# Patient Record
Sex: MALE | Race: WHITE | NOT HISPANIC OR LATINO | URBAN - METROPOLITAN AREA
[De-identification: names, ages, dates, MRNs, and addresses within clinical notes are randomized per-mention and may not be internally consistent; named-entity substitution may affect disease eponyms.]

---

## 2022-08-25 ENCOUNTER — APPOINTMENT (RX ONLY)
Dept: URBAN - METROPOLITAN AREA CLINIC 187 | Facility: CLINIC | Age: 72
Setting detail: DERMATOLOGY
End: 2022-08-25

## 2022-08-25 DIAGNOSIS — D22 MELANOCYTIC NEVI: ICD-10-CM | Status: INADEQUATELY CONTROLLED

## 2022-08-25 DIAGNOSIS — D485 NEOPLASM OF UNCERTAIN BEHAVIOR OF SKIN: ICD-10-CM

## 2022-08-25 PROBLEM — D22.5 MELANOCYTIC NEVI OF TRUNK: Status: ACTIVE | Noted: 2022-08-25

## 2022-08-25 PROBLEM — D48.5 NEOPLASM OF UNCERTAIN BEHAVIOR OF SKIN: Status: ACTIVE | Noted: 2022-08-25

## 2022-08-25 PROCEDURE — 11102 TANGNTL BX SKIN SINGLE LES: CPT | Mod: 59

## 2022-08-25 PROCEDURE — 11302 SHAVE SKIN LESION 1.1-2.0 CM: CPT

## 2022-08-25 PROCEDURE — ? COUNSELING

## 2022-08-25 PROCEDURE — ? SHAVE REMOVAL

## 2022-08-25 PROCEDURE — ? BIOPSY BY SHAVE METHOD

## 2022-08-25 ASSESSMENT — LOCATION DETAILED DESCRIPTION DERM
LOCATION DETAILED: RIGHT MEDIAL INFERIOR CHEST
LOCATION DETAILED: LEFT MEDIAL INFERIOR CHEST

## 2022-08-25 ASSESSMENT — LOCATION SIMPLE DESCRIPTION DERM: LOCATION SIMPLE: CHEST

## 2022-08-25 ASSESSMENT — LOCATION ZONE DERM: LOCATION ZONE: TRUNK

## 2022-08-25 NOTE — PROCEDURE: BIOPSY BY SHAVE METHOD
Body Location Override (Optional - Billing Will Still Be Based On Selected Body Map Location If Applicable): left chest
Detail Level: Detailed
Depth Of Biopsy: dermis
Was A Bandage Applied: Yes
Size Of Lesion In Cm: 0.3
X Size Of Lesion In Cm: 0
Biopsy Type: H and E
Biopsy Method: Dermablade
Anesthesia Type: 1% lidocaine with epinephrine
Anesthesia Volume In Cc (Will Not Render If 0): 0.5
Hemostasis: Electrocautery
Wound Care: Vaseline
Dressing: bandage
Destruction After The Procedure: No
Type Of Destruction Used: Curettage
Curettage Text: The wound bed was treated with curettage after the biopsy was performed.
Cryotherapy Text: The wound bed was treated with cryotherapy after the biopsy was performed.
Electrodesiccation Text: The wound bed was treated with electrodesiccation after the biopsy was performed.
Electrodesiccation And Curettage Text: The wound bed was treated with electrodesiccation and curettage after the biopsy was performed.
Silver Nitrate Text: The wound bed was treated with silver nitrate after the biopsy was performed.
Lab: -[id number]
Consent: Written consent was obtained and risks were reviewed including but not limited to scarring, infection, bleeding, scabbing, incomplete removal, nerve damage and allergy to anesthesia.
Post-Care Instructions: I reviewed with the patient in detail post-care instructions. Patient is to keep the biopsy site dry overnight, and then apply bacitracin twice daily until healed. Patient may apply hydrogen peroxide soaks to remove any crusting.
Notification Instructions: Patient will be notified of biopsy results. However, patient instructed to call the office if not contacted within 2 weeks.
Billing Type: [employer]
Information: Selecting Yes will display possible errors in your note based on the variables you have selected. This validation is only offered as a suggestion for you. PLEASE NOTE THAT THE VALIDATION TEXT WILL BE REMOVED WHEN YOU FINALIZE YOUR NOTE. IF YOU WANT TO FAX A PRELIMINARY NOTE YOU WILL NEED TO TOGGLE THIS TO 'NO' IF YOU DO NOT WANT IT IN YOUR FAXED NOTE.

## 2022-08-25 NOTE — HPI: SKIN LESION (DYSPLASTIC NEVUS)
Is This A New Presentation, Or A Follow-Up?: Dysplastic Nevus
When Was The Dysplastic Nevus Biopsied? (Optional): 7/19/22
Accession # (Optional): [id number]
Location From Outside Provider (Will Override Previously Chosen Location): Right chest

## 2022-08-25 NOTE — PROCEDURE: SHAVE REMOVAL
Medical Necessity Information: It is in your best interest to select a reason for this procedure from the list below. All of these items fulfill various CMS LCD requirements except the new and changing color options.
Medical Necessity Clause: This procedure was medically necessary because the lesion that was treated was:
Lab: -[id number]
Lab Facility: [id number]
Body Location Override (Optional - Billing Will Still Be Based On Selected Body Map Location If Applicable): right chest
Detail Level: Detailed
Was A Bandage Applied: Yes
Size Of Lesion In Cm (Required): 1.5
Biopsy Method: Dermablade
Anesthesia Type: 1% lidocaine with epinephrine
Anesthesia Volume In Cc: 4
Hemostasis: Electrocautery
Wound Care: Vaseline
Accession #: [id number]
Render Path Notes In Note?: No
Consent was obtained from the patient. The risks and benefits to therapy were discussed in detail. Specifically, the risks of infection, scarring, bleeding, prolonged wound healing, incomplete removal, allergy to anesthesia, nerve injury and recurrence were addressed. Prior to the procedure, the treatment site was clearly identified and confirmed by the patient. All components of Universal Protocol/PAUSE Rule completed.
Post-Care Instructions: I reviewed with the patient in detail post-care instructions. Patient is to keep the biopsy site dry overnight, and then apply bacitracin twice daily until healed. Patient may apply hydrogen peroxide soaks to remove any crusting.
Notification Instructions: Patient will be notified of pathology results. However, patient instructed to call the office if not contacted within 2 weeks.
Billing Type: [employer]

## 2022-08-25 NOTE — PROCEDURE: MIPS QUALITY
Quality 111:Pneumonia Vaccination Status For Older Adults: Pneumococcal vaccine administered on or after patientâs 60th birthday and before the end of the measurement period
Quality 226: Preventive Care And Screening: Tobacco Use: Screening And Cessation Intervention: Patient screened for tobacco use and is an ex/non-smoker
Detail Level: Detailed
Quality 130: Documentation Of Current Medications In The Medical Record: Current Medications with Name, Dosage, Frequency, or Route not Documented, Reason not Given
Quality 265: Biopsy Follow-Up: Biopsy results reviewed, communicated, tracked, and documented
Quality 431: Preventive Care And Screening: Unhealthy Alcohol Use - Screening: Patient not identified as an unhealthy alcohol user when screened for unhealthy alcohol use using a systematic screening method

## 2022-09-02 ENCOUNTER — APPOINTMENT (RX ONLY)
Dept: URBAN - METROPOLITAN AREA CLINIC 111 | Facility: CLINIC | Age: 72
Setting detail: DERMATOLOGY
End: 2022-09-02

## 2022-09-02 DIAGNOSIS — I87.2 VENOUS INSUFFICIENCY (CHRONIC) (PERIPHERAL): ICD-10-CM

## 2022-09-02 PROCEDURE — 36465 NJX NONCMPND SCLRSNT 1 VEIN: CPT | Mod: LT

## 2022-09-02 PROCEDURE — ? MEDICAL CONSULTATION: VENOUS DISEASE

## 2022-09-02 PROCEDURE — ? DOPPLER US

## 2022-09-02 PROCEDURE — ? VEIN TREATMENT PLAN

## 2022-09-02 PROCEDURE — 93970 EXTREMITY STUDY: CPT

## 2022-09-02 PROCEDURE — ? VARITHENA SCLEROTHERAPY

## 2022-09-02 ASSESSMENT — LOCATION ZONE DERM: LOCATION ZONE: LEG

## 2022-09-02 ASSESSMENT — LOCATION SIMPLE DESCRIPTION DERM: LOCATION SIMPLE: LEFT PRETIBIAL REGION

## 2022-09-02 ASSESSMENT — LOCATION DETAILED DESCRIPTION DERM: LOCATION DETAILED: LEFT DISTAL PRETIBIAL REGION

## 2022-09-02 NOTE — PROCEDURE: VEIN TREATMENT PLAN
Intro Statement (Will Not Render If Left Blank): Patient has failed 3 months of conservative therapy (compression, elevation).
Detail Level: Simple
Symptom Statement (Will Not Render If Left Blank): US demonstrates severe reflux in the right GSV, as well as the distal left GSV. \\n\\nThe plan is to proceed with Varithena of the left distal GSV. \\n\\Marii though he has reflux in the right leg, he has no symptoms. This leg will not be treated. \\n\\nRisks, benefits, and alternatives were discussed.
[place] Sessions - Left: 1

## 2022-09-02 NOTE — PROCEDURE: DOPPLER US
Right Ssv/Lsv Reflux (Sec): no reflux
Right Distal Gsv Size: 6 mm
Detail Level: Simple
Right Distal Ssv/Lsv Size: 3 mm
Right Sfj Reflux (Sec): 2 seconds reflux
Right Mid Gsv Size: 9 mm
Comments: Right GSV becomes epifacial in proximal thgih.
Left Distal Gsv Size: 4 mm
Treatment Number (Optional): Venous: R GSV, L D GSV
Left Mid Gsv Size: 5 mm
Right Sfj Size: 8 mm
Use Ssv Or Lsv: SSV
Right Distal Gsv Reflux (Sec): 3 seconds reflux
Other Right Leg Doppler Findings: Tribs 4.8mm [id number], medial/posterior calf.
Other Left Leg Doppler Findings: Tribs 3.4mm reflux1.4mm. medial calf

## 2022-09-02 NOTE — PROCEDURE: VARITHENA SCLEROTHERAPY
Render Post Care In Note: No
Volume Of Varithena Foam Removed From Canister (Cc): 0
Disposition: Compression stockings and short stretch elastic bandages were placed postoperatively.
Detail Level: Detailed
Consent was obtained with risks, benefits, and alternatives discussed for the above procedures.
Number Of Injections (Will Not Render If 0): 1
Sclerosant (A): Varithena Foam
Sclerosant Volume (Cc): 3
Lot # A: [id number]
Show Inventory Tab: Hide
Expiration Date A (Month Year): NAOP6997

## 2022-09-02 NOTE — PROCEDURE: MEDICAL CONSULTATION: VENOUS DISEASE
Left Leg Induration: 0- None
Include Ceap In The Note?: Yes
Left Leg: Peripheral Vascular Disease?: No
Left Dorsalis Pedis Pulse: 2 (Easily palpable)
Right Leg Circumference: not performed
Detail Level: Detailed
Left Leg Pain: 2- Daily, moderate activity limitation, occasional analgesics
Right Leg Venous Hyperpigmentation: 0- None or focal low intensity (tan)
Left Leg Varicose Veins: 1- Few, scattered: branch varicose veins
Left Leg Compression Therapy: 2- Wears elastic stocking most days
Left Leg Circumference: medium
Right Leg Compression Therapy: 0- None or noncompliant

## 2022-09-02 NOTE — HPI: VARICOSE VEINS (VARICOSITIES)
How Severe Are They?: mild
Is This A New Presentation, Or A Follow-Up?: Varicose Veins
Additional History: Patient is having aches in his left leg for about two months. He is very active and no kind of medical issues.

## 2022-10-14 ENCOUNTER — APPOINTMENT (RX ONLY)
Dept: URBAN - METROPOLITAN AREA CLINIC 111 | Facility: CLINIC | Age: 72
Setting detail: DERMATOLOGY
End: 2022-10-14

## 2022-10-14 DIAGNOSIS — I87.2 VENOUS INSUFFICIENCY (CHRONIC) (PERIPHERAL): ICD-10-CM

## 2022-10-14 PROCEDURE — ? LOWER EXTREMITY DOPPLER US

## 2022-10-14 PROCEDURE — 93971 EXTREMITY STUDY: CPT

## 2022-10-14 NOTE — PROCEDURE: LOWER EXTREMITY DOPPLER US
Size Options: Use Free Text
Add Milliseconds Of Reflux To Note?: Yes
Size In Mm: 3.4
Detail Level: Simple
Intraluminal Thrombus: No
Treatment Number (Optional): Venous: L Varithena F/U: D GSV Closed
Size In Mm: 5.3
Left Intraluminal Thrombus- No: The left deep veins were imaged from the level of the common femoral vein to the posterior tibial veins. All deep veins demonstrated compressibility without evidence of intraluminal thrombus.
Right Intraluminal Thrombus- No: The right deep veins were imaged from the level of the common femoral vein to the posterior tibial veins. All deep veins demonstrated compressibility without evidence of intraluminal thrombus.
Continue Conservative Therapy Text: Continue conservative treatment (such as compression stockings, OTC analgesics, and exercise) and consider intervention if no change or worsening symptoms to varicosities.
Size In Mm: 5.5
Left Tributary Reflux (In Seconds - Leave Blank If Not Applicable): 0.8
Size In Mm: 3.8
Size In Mm: 0.0
Size In Mm: 5.2
Left Intraluminal Thrombus- Yes: The left deep veins were imaged from the level of the common femoral vein to the posterior tibial veins. There was evidence of intraluminal thrombus as noted above.
Left Tributary Size In Mm: 3.6
Right Intraluminal Thrombus- Yes: The right deep veins were imaged from the level of the common femoral vein to the posterior tibial veins. There was evidence of intraluminal thrombus as noted above.

## 2022-10-28 ENCOUNTER — APPOINTMENT (RX ONLY)
Dept: URBAN - METROPOLITAN AREA CLINIC 111 | Facility: CLINIC | Age: 72
Setting detail: DERMATOLOGY
End: 2022-10-28

## 2022-10-28 DIAGNOSIS — I87.2 VENOUS INSUFFICIENCY (CHRONIC) (PERIPHERAL): ICD-10-CM

## 2022-10-28 PROCEDURE — 99213 OFFICE O/P EST LOW 20 MIN: CPT

## 2022-10-28 PROCEDURE — ? FOLLOW UP ORDERS

## 2022-10-28 NOTE — PROCEDURE: FOLLOW UP ORDERS
Detail Level: Zone
Follow-Up Preamble: Patient is s/p Varithena left distal GSV. Current US shows left distal GSV closed. He has no new symptoms. His cramping and heaviness have resolved. \\n\\nHis prior US showed reflux in the right GSV, but he has no symptoms in the right leg. \\n\\nWe will continue to observe.
Follow-Up (Free Text): One year with US, or sooner if he develops any symptoms.

## 2024-04-05 ENCOUNTER — APPOINTMENT (RX ONLY)
Dept: URBAN - METROPOLITAN AREA CLINIC 187 | Facility: CLINIC | Age: 74
Setting detail: DERMATOLOGY
End: 2024-04-05

## 2024-04-05 DIAGNOSIS — L57.8 OTHER SKIN CHANGES DUE TO CHRONIC EXPOSURE TO NONIONIZING RADIATION: ICD-10-CM | Status: INADEQUATELY CONTROLLED

## 2024-04-05 DIAGNOSIS — L85.3 XEROSIS CUTIS: ICD-10-CM

## 2024-04-05 DIAGNOSIS — L81.4 OTHER MELANIN HYPERPIGMENTATION: ICD-10-CM | Status: UNCHANGED

## 2024-04-05 DIAGNOSIS — L57.0 ACTINIC KERATOSIS: ICD-10-CM | Status: WORSENING

## 2024-04-05 DIAGNOSIS — D18.0 HEMANGIOMA: ICD-10-CM | Status: UNCHANGED

## 2024-04-05 DIAGNOSIS — L82.1 OTHER SEBORRHEIC KERATOSIS: ICD-10-CM | Status: UNCHANGED

## 2024-04-05 PROBLEM — D18.01 HEMANGIOMA OF SKIN AND SUBCUTANEOUS TISSUE: Status: ACTIVE | Noted: 2024-04-05

## 2024-04-05 PROCEDURE — ? SUNSCREEN RECOMMENDATIONS

## 2024-04-05 PROCEDURE — ? COUNSELING

## 2024-04-05 PROCEDURE — ? LIQUID NITROGEN

## 2024-04-05 PROCEDURE — 17000 DESTRUCT PREMALG LESION: CPT

## 2024-04-05 PROCEDURE — 17003 DESTRUCT PREMALG LES 2-14: CPT

## 2024-04-05 PROCEDURE — 99213 OFFICE O/P EST LOW 20 MIN: CPT | Mod: 25

## 2024-04-05 PROCEDURE — ? EDUCATIONAL RESOURCES PROVIDED

## 2024-04-05 PROCEDURE — ? OTC TREATMENT REGIMEN

## 2024-04-05 ASSESSMENT — LOCATION SIMPLE DESCRIPTION DERM
LOCATION SIMPLE: RIGHT FOREARM
LOCATION SIMPLE: LEFT THIGH
LOCATION SIMPLE: RIGHT PRETIBIAL REGION
LOCATION SIMPLE: LEFT SHOULDER
LOCATION SIMPLE: RIGHT SHOULDER
LOCATION SIMPLE: RIGHT WRIST
LOCATION SIMPLE: RIGHT KNEE
LOCATION SIMPLE: POSTERIOR NECK
LOCATION SIMPLE: LEFT UPPER BACK
LOCATION SIMPLE: RIGHT THIGH
LOCATION SIMPLE: RIGHT UPPER BACK
LOCATION SIMPLE: LEFT LOWER BACK
LOCATION SIMPLE: LEFT FOREARM
LOCATION SIMPLE: LEFT PRETIBIAL REGION
LOCATION SIMPLE: LEFT FOREHEAD
LOCATION SIMPLE: RIGHT ELBOW
LOCATION SIMPLE: CHEST
LOCATION SIMPLE: ABDOMEN
LOCATION SIMPLE: LEFT EAR
LOCATION SIMPLE: LEFT TEMPLE
LOCATION SIMPLE: LEFT POSTERIOR UPPER ARM

## 2024-04-05 ASSESSMENT — LOCATION ZONE DERM
LOCATION ZONE: EAR
LOCATION ZONE: TRUNK
LOCATION ZONE: LEG
LOCATION ZONE: ARM
LOCATION ZONE: FACE
LOCATION ZONE: NECK

## 2024-04-05 ASSESSMENT — LOCATION DETAILED DESCRIPTION DERM
LOCATION DETAILED: RIGHT SUPERIOR UPPER BACK
LOCATION DETAILED: LEFT MID-UPPER BACK
LOCATION DETAILED: LEFT DISTAL ULNAR DORSAL FOREARM
LOCATION DETAILED: LEFT DISTAL DORSAL FOREARM
LOCATION DETAILED: RIGHT PROXIMAL DORSAL FOREARM
LOCATION DETAILED: RIGHT MEDIAL SUPERIOR CHEST
LOCATION DETAILED: RIGHT LATERAL ABDOMEN
LOCATION DETAILED: RIGHT POSTERIOR SHOULDER
LOCATION DETAILED: LEFT LATERAL ABDOMEN
LOCATION DETAILED: LEFT DISTAL POSTERIOR UPPER ARM
LOCATION DETAILED: LEFT PROXIMAL PRETIBIAL REGION
LOCATION DETAILED: RIGHT PROXIMAL PRETIBIAL REGION
LOCATION DETAILED: LEFT PROXIMAL DORSAL FOREARM
LOCATION DETAILED: LEFT MID TEMPLE
LOCATION DETAILED: LEFT ANTERIOR PROXIMAL THIGH
LOCATION DETAILED: RIGHT MID-UPPER BACK
LOCATION DETAILED: STERNAL NOTCH
LOCATION DETAILED: LEFT ANTERIOR SHOULDER
LOCATION DETAILED: RIGHT DISTAL PRETIBIAL REGION
LOCATION DETAILED: MID POSTERIOR NECK
LOCATION DETAILED: LEFT SUPERIOR MEDIAL UPPER BACK
LOCATION DETAILED: LEFT POSTERIOR SHOULDER
LOCATION DETAILED: RIGHT ANTERIOR SHOULDER
LOCATION DETAILED: LEFT ANTERIOR DISTAL THIGH
LOCATION DETAILED: LEFT SUPERIOR MEDIAL MIDBACK
LOCATION DETAILED: RIGHT DORSAL WRIST
LOCATION DETAILED: LEFT INFERIOR LATERAL FOREHEAD
LOCATION DETAILED: LEFT SUPERIOR UPPER BACK
LOCATION DETAILED: RIGHT DISTAL DORSAL FOREARM
LOCATION DETAILED: STERNUM
LOCATION DETAILED: RIGHT ANTERIOR DISTAL THIGH
LOCATION DETAILED: RIGHT KNEE
LOCATION DETAILED: LEFT SCAPHA
LOCATION DETAILED: LEFT MEDIAL SUPERIOR CHEST
LOCATION DETAILED: RIGHT DISTAL RADIAL DORSAL FOREARM
LOCATION DETAILED: EPIGASTRIC SKIN
LOCATION DETAILED: RIGHT VENTRAL DISTAL FOREARM
LOCATION DETAILED: LEFT DISTAL PRETIBIAL REGION
LOCATION DETAILED: LEFT SUPERIOR LATERAL UPPER BACK
LOCATION DETAILED: LEFT MEDIAL UPPER BACK
LOCATION DETAILED: RIGHT ELBOW

## 2024-04-05 NOTE — PROCEDURE: LIQUID NITROGEN
Show Applicator Variable?: No
Render Note In Bullet Format When Appropriate: Yes
Application Tool (Optional): Cry-AC
Post-Care Instructions: I reviewed with the patient in detail post-care instructions. Patient is to wear sunprotection, and avoid picking at any of the treated lesions. Pt may apply Vaseline to crusted or scabbing areas.
Number Of Freeze-Thaw Cycles: 3 freeze-thaw cycles
Detail Level: Detailed
Consent: The patient's consent was obtained including but not limited to risks of crusting, scabbing, blistering, scarring, darker or lighter pigmentary change, recurrence, incomplete removal and infection.
Duration Of Freeze Thaw-Cycle (Seconds): 10